# Patient Record
(demographics unavailable — no encounter records)

---

## 2024-10-08 NOTE — PROCEDURE
[FreeTextEntry1] : Laboratory polysomnography shows sleep latency of 1 minute and 98% sleep efficiency both REM and N3 sleep are seen

## 2024-10-08 NOTE — ASSESSMENT
[FreeTextEntry1] : Excessive daytime sleepiness.  May be an issue of chronic sleep deprivation versus a mild form of narcolepsy versus idiopathic hypersomnia.  She reports no narcolepsy type features.  I have asked her to get more sleep and follow-up with me in 3 months.  If symptoms persist we will proceed with polysomnography and MSLT

## 2024-10-08 NOTE — HISTORY OF PRESENT ILLNESS
[TextBox_4] : Follow-up for hypersomnia went to sleep lab for diagnostic PSG here to review results continues to report symptoms of daytime sleepiness and nonrestorative sleep.  However she is able to work a 12-hour shift 3 days a week.  She tends to sleep about 8 hours per night on her nonworkdays.

## 2024-11-20 NOTE — DISCUSSION/SUMMARY
[FreeTextEntry1] : Echocardiogram rule out pericardial disease. Reassurance. Ibuprofen prn. Primary care follow up advised.  [EKG obtained to assist in diagnosis and management of assessed problem(s)] : EKG obtained to assist in diagnosis and management of assessed problem(s)

## 2024-11-20 NOTE — HISTORY OF PRESENT ILLNESS
[FreeTextEntry1] : ARIANA RIVERA is a 28 year old female presents for eval of chest pain. Reports several weeks to months of intermittent bilateral anterior chest pain.  Sporadic, can be one side or the other.  No clear ppt, exacerbating or alleviating factors. Lasts few minutes, and resolves spontaneously. No associated symptoms.  Soc - never smoked. Works as OR nurse. Fam hist - non-contrib.

## 2024-12-12 NOTE — PLAN
[FreeTextEntry1] : Examination Pap smear and vaginal culture was done, Since patient complains of vaginal odor patient was treated for BV. Prescription given for left breast ultrasound for nodularity in the left breast upper outer quadrant

## 2024-12-12 NOTE — HISTORY OF PRESENT ILLNESS
[PapSmeardate] : 12/2023 [Vagina] : vagina [TextBox_4] : Vaginal discharge and odor [Regular Cycle Intervals] : periods have been regular [FreeTextEntry1] : 11/28/24

## 2024-12-12 NOTE — PHYSICAL EXAM
[Chaperone Present] : A chaperone was present in the examining room during all aspects of the physical examination [08203] : A chaperone was present during the pelvic exam. [FreeTextEntry2] : Ayde Peralta [Appropriately responsive] : appropriately responsive [Alert] : alert [No Acute Distress] : no acute distress [No Lymphadenopathy] : no lymphadenopathy [Regular Rate Rhythm] : regular rate rhythm [No Murmurs] : no murmurs [Clear to Auscultation B/L] : clear to auscultation bilaterally [Soft] : soft [Non-tender] : non-tender [Non-distended] : non-distended [No HSM] : No HSM [No Lesions] : no lesions [No Mass] : no mass [Oriented x3] : oriented x3 [Examination Of The Breasts] : a normal appearance [___cm] : a ~M [unfilled] ~Ucm superior medial quadrant mass was palpated [Labia Majora] : normal [Labia Minora] : normal [Discharge] : a  ~M vaginal discharge was present [Moderate] : moderate [White] : white [Thick] : thick [Normal] : normal [Uterine Adnexae] : normal

## 2025-03-19 NOTE — HISTORY OF PRESENT ILLNESS
[FreeTextEntry8] : 1) abd pain 2) neck pain 3) "abd pulsation" 4) thyroid right lobe abnormal   1) abdominal pain- started a few months ago, on and off, comes every week or so, RUQ sometimes LUQ no changes to bowels, no nausea or vomiting no r/s to any particular foods, not linked to timing of meals typically lasts for a few mins  BMs are daily, formed, sometimes loose describes the pain as a dull ache    2) b/l neck pain  started about a month ago first noted the pain while walking around  happens with different activities and at rest no injuries    Pt states she was in UC about two weeks ago for LBP and was concerned about her kidneys. they checked her urine but it returned neg. She would like labs to check a white count and kidney function

## 2025-03-19 NOTE — PHYSICAL EXAM
[No Acute Distress] : no acute distress [Supple] : supple [Normal S1, S2] : normal S1 and S2 [No Murmur] : no murmur heard [Soft] : abdomen soft [Non Tender] : non-tender [Non-distended] : non-distended [No Masses] : no abdominal mass palpated [No HSM] : no HSM [Normal Bowel Sounds] : normal bowel sounds [No Focal Deficits] : no focal deficits

## 2025-05-21 NOTE — HISTORY OF PRESENT ILLNESS
[FreeTextEntry1] : CPE [de-identified] : working 3 twelves. trying to save money so bringing fruit for lunch.  breakfast: nothing lunch: bagel and cream cheese, fruit dinner: [employer] prepared food- shrimp jose snacks: nothing  exercise: running, 5k, twice a week

## 2025-07-07 NOTE — REASON FOR VISIT
[Initial] : an initial consultation for [Vulvar/Vaginal Complaint] : vulvar/vaginal complaint [FreeTextEntry2] : Fibroid uterus

## 2025-07-07 NOTE — END OF VISIT
Patient called to let Millie know that the diarrhea continues.  He would like a call back.  Patient is aware that Millie is off today and that he may get a CB from a nurse.   [Time Spent: ___ minutes] : I have spent [unfilled] minutes of time on the encounter which excludes teaching and separately reported services.

## 2025-07-07 NOTE — DISCUSSION/SUMMARY
[FreeTextEntry1] :  GYN evaluation today -vaginal cultures sent TAS done today for -- Oligomenorrhea, Fibroid Uterus,  We discussed --- Oligomenorrhea, Fibroid Uterus and Acute vaginitis causes and treatment options. PLAN:  We discussed patient presenting with concerns about shorter and lighter periods, as well as vaginal discharge ranging from white to tannish color. Pelvic ultrasound performed, showing normal-sized ovaries and a small fibroid (1.67 cm). No signs of premature ovarian failure observed. Order blood work to be done on the 3rd or 4th day of the patient's next menstrual cycle Check thyroid function and hormone levels Provide referral for reproductive endocrinologist to discuss potential egg freezing, discussing fertility preservation options is appropriate. Patient inquired about stress levels, thyroid function, and iron levels as potential factors affecting menstrual cycle. Include thyroid function tests and complete blood count in lab work, Order iron studies to rule out iron deficiency, Discuss stress management techniques and their impact on overall health Patient reports vaginal discharge ranging from white to tannish color. No signs of infection or STDs noted during examination. Monitor symptoms Educate patient on normal vaginal discharge.  Patient verbalized understanding of all explanations, and her questions were answered, and all concerns were addressed. Patient was screened for depression - no signs of clinical depression. PHQ-2 on file Rx given for mammogram and B sonogram RTO in 6 months for f/u    Brendon PATINO acting as scribe for Dr. Peralta. 07/02/2025   The documentation recorded by the scribe, in my presence, accurately reflects the service I personally performed, and the decisions made by me with my edits as appropriate on 07/07/2025  Abby Peralta MD, FACOG

## 2025-07-07 NOTE — HISTORY OF PRESENT ILLNESS
[Patient reported PAP Smear was normal] : Patient reported PAP Smear was normal [N] : Patient denies prior pregnancies [No] : Patient does not have concerns regarding sex [Previously active] : previously active [Men] : men [FreeTextEntry1] : Initial GYN evaluation [TextBox_4] : 30 YO F patient presents for GYN check-up exam. Chief c/o: concerned that periods are getting shorter and lighter. Has white to tan vaginal discharge   [PapSmeardate] : 12/12/24 [HPVDate] : 12/12/24 [LMPDate] : 6/11/25

## 2025-07-07 NOTE — PHYSICAL EXAM
[Chaperoned Physical Exam] : A chaperone was present in the examining room during all aspects of the physical examination. [MA] : MA [Appropriately responsive] : appropriately responsive [Alert] : alert [No Acute Distress] : no acute distress [No Lymphadenopathy] : no lymphadenopathy [Regular Rate Rhythm] : regular rate rhythm [No Murmurs] : no murmurs [Clear to Auscultation B/L] : clear to auscultation bilaterally [Soft] : soft [Non-tender] : non-tender [Non-distended] : non-distended [No HSM] : No HSM [No Lesions] : no lesions [No Mass] : no mass [Oriented x3] : oriented x3 [Examination Of The Breasts] : a normal appearance [No Discharge] : no discharge [No Masses] : no breast masses were palpable [Labia Majora] : normal [Labia Minora] : normal [Discharge] : a  ~M vaginal discharge was present [Scant] : scant [White] : white [Thick] : thick [No Bleeding] : There was no active vaginal bleeding [Normal] : normal [Uterine Adnexae] : non-palpable [FreeTextEntry2] : Brendon [FreeTextEntry6] : No tenderness, No nipple discharge and no adenopathy.

## 2025-07-07 NOTE — HISTORY OF PRESENT ILLNESS
[Patient reported PAP Smear was normal] : Patient reported PAP Smear was normal [N] : Patient denies prior pregnancies [No] : Patient does not have concerns regarding sex [Previously active] : previously active [Men] : men [FreeTextEntry1] : Initial GYN evaluation [TextBox_4] : 28 YO F patient presents for GYN check-up exam. Chief c/o: concerned that periods are getting shorter and lighter. Has white to tan vaginal discharge   [PapSmeardate] : 12/12/24 [HPVDate] : 12/12/24 [LMPDate] : 6/11/25